# Patient Record
Sex: MALE | URBAN - METROPOLITAN AREA
[De-identification: names, ages, dates, MRNs, and addresses within clinical notes are randomized per-mention and may not be internally consistent; named-entity substitution may affect disease eponyms.]

---

## 2017-06-05 ENCOUNTER — APPOINTMENT (RX ONLY)
Dept: URBAN - METROPOLITAN AREA CLINIC 17 | Facility: CLINIC | Age: 82
Setting detail: DERMATOLOGY
End: 2017-06-05

## 2017-06-05 DIAGNOSIS — L81.4 OTHER MELANIN HYPERPIGMENTATION: ICD-10-CM

## 2017-06-05 DIAGNOSIS — L57.0 ACTINIC KERATOSIS: ICD-10-CM

## 2017-06-05 DIAGNOSIS — Z71.89 OTHER SPECIFIED COUNSELING: ICD-10-CM

## 2017-06-05 DIAGNOSIS — L82.1 OTHER SEBORRHEIC KERATOSIS: ICD-10-CM

## 2017-06-05 DIAGNOSIS — D18.0 HEMANGIOMA: ICD-10-CM

## 2017-06-05 PROBLEM — D18.01 HEMANGIOMA OF SKIN AND SUBCUTANEOUS TISSUE: Status: ACTIVE | Noted: 2017-06-05

## 2017-06-05 PROCEDURE — 99213 OFFICE O/P EST LOW 20 MIN: CPT | Mod: 25

## 2017-06-05 PROCEDURE — 17000 DESTRUCT PREMALG LESION: CPT

## 2017-06-05 PROCEDURE — ? LIQUID NITROGEN

## 2017-06-05 PROCEDURE — 17003 DESTRUCT PREMALG LES 2-14: CPT

## 2017-06-05 PROCEDURE — ? COUNSELING

## 2017-06-05 ASSESSMENT — LOCATION ZONE DERM
LOCATION ZONE: FACE
LOCATION ZONE: EAR

## 2017-06-05 ASSESSMENT — LOCATION DETAILED DESCRIPTION DERM
LOCATION DETAILED: LEFT INFERIOR TEMPLE
LOCATION DETAILED: LEFT SUPERIOR HELIX

## 2017-06-05 ASSESSMENT — LOCATION SIMPLE DESCRIPTION DERM
LOCATION SIMPLE: LEFT TEMPLE
LOCATION SIMPLE: LEFT EAR

## 2017-06-05 ASSESSMENT — TOTAL NUMBER OF LESIONS: # OF LESIONS?: 2

## 2017-06-05 NOTE — PROCEDURE: LIQUID NITROGEN
Total Number Of Aks Treated: 2
Render Post-Care Instructions In Note?: no
Duration Of Freeze Thaw-Cycle (Seconds): 5
Consent: The patient's consent was obtained including but not limited to risks of crusting, scabbing, blistering, scarring, darker or lighter pigmentary change, recurrence, incomplete removal and infection.
Detail Level: Simple
Post-Care Instructions: LIQUID NITROGEN TREATMENT\\n(Cryosurgery)\\n\\n Liquid Nitrogen (LN2) is a cold, liquefied gas with a temperature of 196 degrees below zero Celsius (minus 321 degrees Fahrenheit).  It is used to freeze and destroy superficial skin growths such as warts and keratoses.  It can also be used to treat pre-malignant skin lesions known as actinic keratoses.  LN2 causes stinging and mild pain while the growth is being frozen and then thaws.  The discomfort usually lasts less than fifteen minutes.  On the fingers a throbbing pain will occasionally last  a few hours.\\n After LN2 treatment your skin will become swollen and red and it may blister.  Then a scab will form.  It will fall off by itself in one to three weeks.  The skin growth will come off with the scab, leaving healthy, new skin.\\n Generally, no special care is needed after liquid nitrogen treatment.  Just ignore it.  You can wash your skin as usual and use make-up or other cosmetics.  If clothing irritates the area, cover it with a small bandage (Band-Aid).\\n If a very large or uncomfortable blister develops you may open it with a sterilized needle.  The needle can be sterilized by wiping with rubbing alcohol.  Gently clean the blistered area with soap and water, followed by alcohol.  Insert the needle into the edge of the blister as needed to allow the blister contents to escape.  Press the blister gently to force out the fluid or blood.  This will reduce pain caused by blister fluid pressure.  If a blister re-forms it may be opened again.  Do not remove the top of the blister since its presence helps prevent infection until the new skin beneath can replace it.  Protect open sores or punctured blisters with Polysporin or Bacitracin antibiotic ointment (available without a prescription) under a Band-Aid for 24-48 hours.\\n If you notice any signs of infection such as oozing of a discolored substance (pus), spreading redness, excessive swelling or increased pain usually appearing 2 days or more after the office treatment, please call the office.  Your usual pain reliever (such as Tylenol and aspirin) is normally sufficient to alleviate discomfort.  Intermittent use of an ice pack for 5 to 10 minutes each hour can also be used if needed.  If you have severe pain, please call the office.  Do not pick at crusted areas.  Allow them to come off on their own.\\n If a lesion was treated near your eye, you may notice swelling of one or both lids within 24 hours, so that your lids are partially closed.  This is harmless, will not affect your vision, and will resolve by itself in a day or two.\\n Sometimes LN2 fails or does not completely remove the growth.  This is especially true with larger warts, which often require more than a single treatment for cure.  If the growth is not cured with LN2 and you do not have a scheduled follow-up appointment for further treatment, please call to make a return appointment.

## 2018-06-05 ENCOUNTER — APPOINTMENT (RX ONLY)
Dept: URBAN - METROPOLITAN AREA CLINIC 17 | Facility: CLINIC | Age: 83
Setting detail: DERMATOLOGY
End: 2018-06-05

## 2018-06-05 DIAGNOSIS — Z71.89 OTHER SPECIFIED COUNSELING: ICD-10-CM

## 2018-06-05 DIAGNOSIS — L81.4 OTHER MELANIN HYPERPIGMENTATION: ICD-10-CM

## 2018-06-05 DIAGNOSIS — L85.3 XEROSIS CUTIS: ICD-10-CM

## 2018-06-05 DIAGNOSIS — D18.0 HEMANGIOMA: ICD-10-CM

## 2018-06-05 DIAGNOSIS — L57.0 ACTINIC KERATOSIS: ICD-10-CM

## 2018-06-05 DIAGNOSIS — L82.1 OTHER SEBORRHEIC KERATOSIS: ICD-10-CM

## 2018-06-05 PROBLEM — D18.01 HEMANGIOMA OF SKIN AND SUBCUTANEOUS TISSUE: Status: ACTIVE | Noted: 2018-06-05

## 2018-06-05 PROCEDURE — ? LIQUID NITROGEN

## 2018-06-05 PROCEDURE — 17003 DESTRUCT PREMALG LES 2-14: CPT

## 2018-06-05 PROCEDURE — 17000 DESTRUCT PREMALG LESION: CPT

## 2018-06-05 PROCEDURE — ? COUNSELING

## 2018-06-05 PROCEDURE — ? PRESCRIPTION

## 2018-06-05 PROCEDURE — 99213 OFFICE O/P EST LOW 20 MIN: CPT | Mod: 25

## 2018-06-05 RX ORDER — TRIAMCINOLONE ACETONIDE 1 MG/G
CREAM TOPICAL BID
Qty: 1 | Refills: 3 | Status: ERX | COMMUNITY
Start: 2018-06-05

## 2018-06-05 RX ADMIN — TRIAMCINOLONE ACETONIDE: 1 CREAM TOPICAL at 16:05

## 2018-06-05 ASSESSMENT — LOCATION ZONE DERM
LOCATION ZONE: EAR
LOCATION ZONE: HAND

## 2018-06-05 ASSESSMENT — TOTAL NUMBER OF LESIONS: # OF LESIONS?: 2

## 2018-06-05 ASSESSMENT — LOCATION DETAILED DESCRIPTION DERM
LOCATION DETAILED: LEFT ULNAR DORSAL HAND
LOCATION DETAILED: LEFT SCAPHA

## 2018-06-05 ASSESSMENT — LOCATION SIMPLE DESCRIPTION DERM
LOCATION SIMPLE: LEFT HAND
LOCATION SIMPLE: LEFT EAR

## 2018-06-05 NOTE — PROCEDURE: LIQUID NITROGEN
Render Post-Care Instructions In Note?: no
Total Number Of Aks Treated: 2
Detail Level: Simple
Post-Care Instructions: LIQUID NITROGEN TREATMENT\\n(Cryosurgery)\\n\\n Liquid Nitrogen (LN2) is a cold, liquefied gas with a temperature of 196 degrees below zero Celsius (minus 321 degrees Fahrenheit).  It is used to freeze and destroy superficial skin growths such as warts and keratoses.  It can also be used to treat pre-malignant skin lesions known as actinic keratoses.  LN2 causes stinging and mild pain while the growth is being frozen and then thaws.  The discomfort usually lasts less than fifteen minutes.  On the fingers a throbbing pain will occasionally last  a few hours.\\n After LN2 treatment your skin will become swollen and red and it may blister.  Then a scab will form.  It will fall off by itself in one to three weeks.  The skin growth will come off with the scab, leaving healthy, new skin.\\n Generally, no special care is needed after liquid nitrogen treatment.  Just ignore it.  You can wash your skin as usual and use make-up or other cosmetics.  If clothing irritates the area, cover it with a small bandage (Band-Aid).\\n If a very large or uncomfortable blister develops you may open it with a sterilized needle.  The needle can be sterilized by wiping with rubbing alcohol.  Gently clean the blistered area with soap and water, followed by alcohol.  Insert the needle into the edge of the blister as needed to allow the blister contents to escape.  Press the blister gently to force out the fluid or blood.  This will reduce pain caused by blister fluid pressure.  If a blister re-forms it may be opened again.  Do not remove the top of the blister since its presence helps prevent infection until the new skin beneath can replace it.  Protect open sores or punctured blisters with Polysporin or Bacitracin antibiotic ointment (available without a prescription) under a Band-Aid for 24-48 hours.\\n If you notice any signs of infection such as oozing of a discolored substance (pus), spreading redness, excessive swelling or increased pain usually appearing 2 days or more after the office treatment, please call the office.  Your usual pain reliever (such as Tylenol and aspirin) is normally sufficient to alleviate discomfort.  Intermittent use of an ice pack for 5 to 10 minutes each hour can also be used if needed.  If you have severe pain, please call the office.  Do not pick at crusted areas.  Allow them to come off on their own.\\n If a lesion was treated near your eye, you may notice swelling of one or both lids within 24 hours, so that your lids are partially closed.  This is harmless, will not affect your vision, and will resolve by itself in a day or two.\\n Sometimes LN2 fails or does not completely remove the growth.  This is especially true with larger warts, which often require more than a single treatment for cure.  If the growth is not cured with LN2 and you do not have a scheduled follow-up appointment for further treatment, please call to make a return appointment.
Duration Of Freeze Thaw-Cycle (Seconds): 5
Consent: The patient's consent was obtained including but not limited to risks of crusting, scabbing, blistering, scarring, darker or lighter pigmentary change, recurrence, incomplete removal and infection.

## 2019-07-22 ENCOUNTER — APPOINTMENT (RX ONLY)
Dept: URBAN - METROPOLITAN AREA CLINIC 17 | Facility: CLINIC | Age: 84
Setting detail: DERMATOLOGY
End: 2019-07-22

## 2019-07-22 DIAGNOSIS — Z71.89 OTHER SPECIFIED COUNSELING: ICD-10-CM

## 2019-07-22 DIAGNOSIS — D18.0 HEMANGIOMA: ICD-10-CM

## 2019-07-22 DIAGNOSIS — L82.1 OTHER SEBORRHEIC KERATOSIS: ICD-10-CM

## 2019-07-22 DIAGNOSIS — L81.4 OTHER MELANIN HYPERPIGMENTATION: ICD-10-CM

## 2019-07-22 DIAGNOSIS — L57.0 ACTINIC KERATOSIS: ICD-10-CM

## 2019-07-22 DIAGNOSIS — L85.3 XEROSIS CUTIS: ICD-10-CM

## 2019-07-22 PROBLEM — D18.01 HEMANGIOMA OF SKIN AND SUBCUTANEOUS TISSUE: Status: ACTIVE | Noted: 2019-07-22

## 2019-07-22 PROCEDURE — ? PRESCRIPTION

## 2019-07-22 PROCEDURE — ? COUNSELING

## 2019-07-22 PROCEDURE — 17000 DESTRUCT PREMALG LESION: CPT

## 2019-07-22 PROCEDURE — ? LIQUID NITROGEN

## 2019-07-22 PROCEDURE — 99213 OFFICE O/P EST LOW 20 MIN: CPT | Mod: 25

## 2019-07-22 PROCEDURE — 17003 DESTRUCT PREMALG LES 2-14: CPT

## 2019-07-22 RX ORDER — TRIAMCINOLONE ACETONIDE 1 MG/G
CREAM TOPICAL BID
Qty: 1 | Refills: 3 | Status: ERX

## 2019-07-22 ASSESSMENT — TOTAL NUMBER OF LESIONS: # OF LESIONS?: 8

## 2019-07-22 ASSESSMENT — LOCATION DETAILED DESCRIPTION DERM
LOCATION DETAILED: RIGHT RADIAL DORSAL HAND
LOCATION DETAILED: LEFT ULNAR DORSAL HAND
LOCATION DETAILED: LEFT INFERIOR LATERAL FOREHEAD

## 2019-07-22 ASSESSMENT — LOCATION ZONE DERM
LOCATION ZONE: HAND
LOCATION ZONE: FACE

## 2019-07-22 ASSESSMENT — LOCATION SIMPLE DESCRIPTION DERM
LOCATION SIMPLE: RIGHT HAND
LOCATION SIMPLE: LEFT FOREHEAD
LOCATION SIMPLE: LEFT HAND

## 2019-07-22 NOTE — PROCEDURE: LIQUID NITROGEN
Post-Care Instructions: LIQUID NITROGEN TREATMENT\\n(Cryosurgery)\\n\\n Liquid Nitrogen (LN2) is a cold, liquefied gas with a temperature of 196 degrees below zero Celsius (minus 321 degrees Fahrenheit).  It is used to freeze and destroy superficial skin growths such as warts and keratoses.  It can also be used to treat pre-malignant skin lesions known as actinic keratoses.  LN2 causes stinging and mild pain while the growth is being frozen and then thaws.  The discomfort usually lasts less than fifteen minutes.  On the fingers a throbbing pain will occasionally last  a few hours.\\n After LN2 treatment your skin will become swollen and red and it may blister.  Then a scab will form.  It will fall off by itself in one to three weeks.  The skin growth will come off with the scab, leaving healthy, new skin.\\n Generally, no special care is needed after liquid nitrogen treatment.  Just ignore it.  You can wash your skin as usual and use make-up or other cosmetics.  If clothing irritates the area, cover it with a small bandage (Band-Aid).\\n If a very large or uncomfortable blister develops you may open it with a sterilized needle.  The needle can be sterilized by wiping with rubbing alcohol.  Gently clean the blistered area with soap and water, followed by alcohol.  Insert the needle into the edge of the blister as needed to allow the blister contents to escape.  Press the blister gently to force out the fluid or blood.  This will reduce pain caused by blister fluid pressure.  If a blister re-forms it may be opened again.  Do not remove the top of the blister since its presence helps prevent infection until the new skin beneath can replace it.  Protect open sores or punctured blisters with Polysporin or Bacitracin antibiotic ointment (available without a prescription) under a Band-Aid for 24-48 hours.\\n If you notice any signs of infection such as oozing of a discolored substance (pus), spreading redness, excessive swelling or increased pain usually appearing 2 days or more after the office treatment, please call the office.  Your usual pain reliever (such as Tylenol and aspirin) is normally sufficient to alleviate discomfort.  Intermittent use of an ice pack for 5 to 10 minutes each hour can also be used if needed.  If you have severe pain, please call the office.  Do not pick at crusted areas.  Allow them to come off on their own.\\n If a lesion was treated near your eye, you may notice swelling of one or both lids within 24 hours, so that your lids are partially closed.  This is harmless, will not affect your vision, and will resolve by itself in a day or two.\\n Sometimes LN2 fails or does not completely remove the growth.  This is especially true with larger warts, which often require more than a single treatment for cure.  If the growth is not cured with LN2 and you do not have a scheduled follow-up appointment for further treatment, please call to make a return appointment.
Consent: The patient's consent was obtained including but not limited to risks of crusting, scabbing, blistering, scarring, darker or lighter pigmentary change, recurrence, incomplete removal and infection.
Detail Level: Simple
Duration Of Freeze Thaw-Cycle (Seconds): 5
Render Post-Care Instructions In Note?: no
Total Number Of Aks Treated: 8

## 2020-08-06 ENCOUNTER — APPOINTMENT (RX ONLY)
Dept: URBAN - METROPOLITAN AREA CLINIC 151 | Facility: CLINIC | Age: 85
Setting detail: DERMATOLOGY
End: 2020-08-06

## 2020-08-06 DIAGNOSIS — L57.0 ACTINIC KERATOSIS: ICD-10-CM

## 2020-08-06 DIAGNOSIS — L91.8 OTHER HYPERTROPHIC DISORDERS OF THE SKIN: ICD-10-CM

## 2020-08-06 DIAGNOSIS — D18.0 HEMANGIOMA: ICD-10-CM

## 2020-08-06 DIAGNOSIS — L82.0 INFLAMED SEBORRHEIC KERATOSIS: ICD-10-CM

## 2020-08-06 DIAGNOSIS — D22 MELANOCYTIC NEVI: ICD-10-CM

## 2020-08-06 PROBLEM — D18.01 HEMANGIOMA OF SKIN AND SUBCUTANEOUS TISSUE: Status: ACTIVE | Noted: 2020-08-06

## 2020-08-06 PROBLEM — D22.61 MELANOCYTIC NEVI OF RIGHT UPPER LIMB, INCLUDING SHOULDER: Status: ACTIVE | Noted: 2020-08-06

## 2020-08-06 PROBLEM — D22.5 MELANOCYTIC NEVI OF TRUNK: Status: ACTIVE | Noted: 2020-08-06

## 2020-08-06 PROCEDURE — 99203 OFFICE O/P NEW LOW 30 MIN: CPT | Mod: 25

## 2020-08-06 PROCEDURE — 17000 DESTRUCT PREMALG LESION: CPT | Mod: 59

## 2020-08-06 PROCEDURE — 17110 DESTRUCTION B9 LES UP TO 14: CPT

## 2020-08-06 PROCEDURE — ? COUNSELING

## 2020-08-06 PROCEDURE — ? LIQUID NITROGEN

## 2020-08-06 ASSESSMENT — LOCATION SIMPLE DESCRIPTION DERM
LOCATION SIMPLE: RIGHT SHOULDER
LOCATION SIMPLE: RIGHT ZYGOMA
LOCATION SIMPLE: ABDOMEN
LOCATION SIMPLE: CHEST
LOCATION SIMPLE: GROIN

## 2020-08-06 ASSESSMENT — LOCATION DETAILED DESCRIPTION DERM
LOCATION DETAILED: RIGHT ANTERIOR SHOULDER
LOCATION DETAILED: RIGHT MEDIAL ZYGOMA
LOCATION DETAILED: RIGHT RIB CAGE
LOCATION DETAILED: LEFT RIB CAGE
LOCATION DETAILED: EPIGASTRIC SKIN
LOCATION DETAILED: LEFT LATERAL INFERIOR CHEST
LOCATION DETAILED: SUPRAPUBIC SKIN
LOCATION DETAILED: STERNUM

## 2020-08-06 ASSESSMENT — LOCATION ZONE DERM
LOCATION ZONE: FACE
LOCATION ZONE: ARM
LOCATION ZONE: TRUNK

## 2020-08-06 NOTE — PROCEDURE: LIQUID NITROGEN
Detail Level: Detailed
Post-Care Instructions: I reviewed with the patient in detail post-care instructions. Patient is to wear sunprotection, and avoid picking at any of the treated lesions. Pt may apply Vaseline to crusted or scabbing areas.
Add 52 Modifier (Optional): no
Number Of Freeze-Thaw Cycles: 2 freeze-thaw cycles
Consent: The patient's consent was obtained including but not limited to risks of crusting, scabbing, blistering, scarring, darker or lighter pigmentary change, recurrence, incomplete removal and infection.
Medical Necessity Clause: This procedure was medically necessary because the lesions that were treated were:
Medical Necessity Information: It is in your best interest to select a reason for this procedure from the list below. All of these items fulfill various CMS LCD requirements except the new and changing color options.
Duration Of Freeze Thaw-Cycle (Seconds): 5

## 2021-02-15 ENCOUNTER — APPOINTMENT (RX ONLY)
Dept: URBAN - METROPOLITAN AREA CLINIC 151 | Facility: CLINIC | Age: 86
Setting detail: DERMATOLOGY
End: 2021-02-15

## 2021-02-15 DIAGNOSIS — L30.8 OTHER SPECIFIED DERMATITIS: ICD-10-CM

## 2021-02-15 DIAGNOSIS — L57.0 ACTINIC KERATOSIS: ICD-10-CM

## 2021-02-15 DIAGNOSIS — D22 MELANOCYTIC NEVI: ICD-10-CM

## 2021-02-15 DIAGNOSIS — L85.3 XEROSIS CUTIS: ICD-10-CM

## 2021-02-15 DIAGNOSIS — D18.0 HEMANGIOMA: ICD-10-CM

## 2021-02-15 PROBLEM — D22.5 MELANOCYTIC NEVI OF TRUNK: Status: ACTIVE | Noted: 2021-02-15

## 2021-02-15 PROBLEM — J30.1 ALLERGIC RHINITIS DUE TO POLLEN: Status: ACTIVE | Noted: 2021-02-15

## 2021-02-15 PROBLEM — D22.61 MELANOCYTIC NEVI OF RIGHT UPPER LIMB, INCLUDING SHOULDER: Status: ACTIVE | Noted: 2021-02-15

## 2021-02-15 PROBLEM — D18.01 HEMANGIOMA OF SKIN AND SUBCUTANEOUS TISSUE: Status: ACTIVE | Noted: 2021-02-15

## 2021-02-15 PROCEDURE — 17003 DESTRUCT PREMALG LES 2-14: CPT

## 2021-02-15 PROCEDURE — ? COUNSELING

## 2021-02-15 PROCEDURE — ? LIQUID NITROGEN

## 2021-02-15 PROCEDURE — 17000 DESTRUCT PREMALG LESION: CPT

## 2021-02-15 PROCEDURE — 99213 OFFICE O/P EST LOW 20 MIN: CPT | Mod: 25

## 2021-02-15 ASSESSMENT — LOCATION SIMPLE DESCRIPTION DERM
LOCATION SIMPLE: LEFT FOREARM
LOCATION SIMPLE: ABDOMEN
LOCATION SIMPLE: RIGHT SHOULDER
LOCATION SIMPLE: LEFT CHEEK
LOCATION SIMPLE: RIGHT LOWER BACK
LOCATION SIMPLE: RIGHT FOREARM
LOCATION SIMPLE: RIGHT CHEEK
LOCATION SIMPLE: CHEST

## 2021-02-15 ASSESSMENT — LOCATION DETAILED DESCRIPTION DERM
LOCATION DETAILED: RIGHT SUPERIOR LATERAL LOWER BACK
LOCATION DETAILED: RIGHT SUPERIOR LATERAL BUCCAL CHEEK
LOCATION DETAILED: LEFT CENTRAL MALAR CHEEK
LOCATION DETAILED: LEFT PROXIMAL DORSAL FOREARM
LOCATION DETAILED: LEFT RIB CAGE
LOCATION DETAILED: RIGHT PROXIMAL DORSAL FOREARM
LOCATION DETAILED: RIGHT ANTERIOR SHOULDER
LOCATION DETAILED: EPIGASTRIC SKIN
LOCATION DETAILED: LEFT LATERAL INFERIOR CHEST

## 2021-02-15 ASSESSMENT — LOCATION ZONE DERM
LOCATION ZONE: TRUNK
LOCATION ZONE: FACE
LOCATION ZONE: ARM

## 2021-02-15 NOTE — PROCEDURE: LIQUID NITROGEN
Number Of Freeze-Thaw Cycles: 3 freeze-thaw cycles
Consent: The patient's consent was obtained including but not limited to risks of crusting, scabbing, blistering, scarring, darker or lighter pigmentary change, recurrence, incomplete removal and infection.
Render Note In Bullet Format When Appropriate: No
Duration Of Freeze Thaw-Cycle (Seconds): 1
Post-Care Instructions: I reviewed with the patient in detail post-care instructions. Patient is to wear sunprotection, and avoid picking at any of the treated lesions. Pt may apply Vaseline to crusted or scabbing areas.
Detail Level: Detailed

## 2022-06-08 ENCOUNTER — APPOINTMENT (RX ONLY)
Dept: URBAN - METROPOLITAN AREA CLINIC 151 | Facility: CLINIC | Age: 87
Setting detail: DERMATOLOGY
End: 2022-06-08

## 2022-06-08 DIAGNOSIS — L81.4 OTHER MELANIN HYPERPIGMENTATION: ICD-10-CM

## 2022-06-08 DIAGNOSIS — D18.0 HEMANGIOMA: ICD-10-CM

## 2022-06-08 DIAGNOSIS — L57.8 OTHER SKIN CHANGES DUE TO CHRONIC EXPOSURE TO NONIONIZING RADIATION: ICD-10-CM

## 2022-06-08 DIAGNOSIS — D22 MELANOCYTIC NEVI: ICD-10-CM

## 2022-06-08 DIAGNOSIS — L82.1 OTHER SEBORRHEIC KERATOSIS: ICD-10-CM

## 2022-06-08 DIAGNOSIS — Z71.89 OTHER SPECIFIED COUNSELING: ICD-10-CM

## 2022-06-08 PROBLEM — D22.5 MELANOCYTIC NEVI OF TRUNK: Status: ACTIVE | Noted: 2022-06-08

## 2022-06-08 PROBLEM — D18.01 HEMANGIOMA OF SKIN AND SUBCUTANEOUS TISSUE: Status: ACTIVE | Noted: 2022-06-08

## 2022-06-08 PROBLEM — D48.5 NEOPLASM OF UNCERTAIN BEHAVIOR OF SKIN: Status: ACTIVE | Noted: 2022-06-08

## 2022-06-08 PROCEDURE — 99213 OFFICE O/P EST LOW 20 MIN: CPT | Mod: 25

## 2022-06-08 PROCEDURE — ? BIOPSY BY SHAVE METHOD

## 2022-06-08 PROCEDURE — ? COUNSELING

## 2022-06-08 PROCEDURE — 11102 TANGNTL BX SKIN SINGLE LES: CPT

## 2022-06-08 ASSESSMENT — LOCATION ZONE DERM
LOCATION ZONE: ARM
LOCATION ZONE: TRUNK

## 2022-06-08 ASSESSMENT — LOCATION DETAILED DESCRIPTION DERM
LOCATION DETAILED: RIGHT MID-UPPER BACK
LOCATION DETAILED: LEFT MEDIAL INFERIOR CHEST
LOCATION DETAILED: RIGHT DISTAL DORSAL FOREARM
LOCATION DETAILED: SUPERIOR THORACIC SPINE
LOCATION DETAILED: LEFT PROXIMAL DORSAL FOREARM
LOCATION DETAILED: RIGHT SUPERIOR MEDIAL UPPER BACK
LOCATION DETAILED: LEFT DISTAL DORSAL FOREARM
LOCATION DETAILED: LEFT CLAVICULAR SKIN
LOCATION DETAILED: LEFT MID-UPPER BACK
LOCATION DETAILED: RIGHT PROXIMAL DORSAL FOREARM

## 2022-06-08 ASSESSMENT — LOCATION SIMPLE DESCRIPTION DERM
LOCATION SIMPLE: LEFT CLAVICULAR SKIN
LOCATION SIMPLE: CHEST
LOCATION SIMPLE: RIGHT FOREARM
LOCATION SIMPLE: RIGHT UPPER BACK
LOCATION SIMPLE: UPPER BACK
LOCATION SIMPLE: LEFT FOREARM
LOCATION SIMPLE: LEFT UPPER BACK

## 2022-06-08 NOTE — PROCEDURE: BIOPSY BY SHAVE METHOD
Curettage Text: The wound bed was treated with curettage after the biopsy was performed.
Dressing: bandage
Render Path Notes In Note?: No
Information: Selecting Yes will display possible errors in your note based on the variables you have selected. This validation is only offered as a suggestion for you. PLEASE NOTE THAT THE VALIDATION TEXT WILL BE REMOVED WHEN YOU FINALIZE YOUR NOTE. IF YOU WANT TO FAX A PRELIMINARY NOTE YOU WILL NEED TO TOGGLE THIS TO 'NO' IF YOU DO NOT WANT IT IN YOUR FAXED NOTE.
Billing Type: Third-Party Bill
Type Of Destruction Used: Curettage
Additional Anesthesia Volume In Cc (Will Not Render If 0): 0
Post-Care Instructions: I reviewed with the patient in detail post-care instructions. Patient is to keep the biopsy site dry overnight, and then apply bacitracin twice daily until healed. Patient may apply hydrogen peroxide soaks to remove any crusting.
Anesthesia Type: 1% lidocaine with epinephrine
Electrodesiccation And Curettage Text: The wound bed was treated with electrodesiccation and curettage after the biopsy was performed.
Was A Bandage Applied: Yes
Biopsy Method: Dermablade
Wound Care: Bacitracin
Depth Of Biopsy: dermis
Hemostasis: Drysol
Biopsy Type: H and E
Electrodesiccation Text: The wound bed was treated with electrodesiccation after the biopsy was performed.
Anesthesia Volume In Cc: 0.5
Consent: Written consent was obtained and risks were reviewed including but not limited to scarring, infection, bleeding, scabbing, incomplete removal, nerve damage and allergy to anesthesia.
Silver Nitrate Text: The wound bed was treated with silver nitrate after the biopsy was performed.
Size Of Lesion In Cm: 0.6
Detail Level: Detailed
Notification Instructions: Patient will be notified of biopsy results. However, patient instructed to call the office if not contacted within 2 weeks.
Cryotherapy Text: The wound bed was treated with cryotherapy after the biopsy was performed.

## 2022-06-28 ENCOUNTER — APPOINTMENT (RX ONLY)
Dept: URBAN - METROPOLITAN AREA CLINIC 151 | Facility: CLINIC | Age: 87
Setting detail: DERMATOLOGY
End: 2022-06-28

## 2022-06-28 PROBLEM — C44.329 SQUAMOUS CELL CARCINOMA OF SKIN OF OTHER PARTS OF FACE: Status: ACTIVE | Noted: 2022-06-28

## 2022-06-28 PROCEDURE — 17280 DSTR MAL LS F/E/E/N/L/M .5/<: CPT

## 2022-06-28 PROCEDURE — ? DIAGNOSIS COMMENT

## 2022-06-28 PROCEDURE — ? CURETTAGE AND DESTRUCTION

## 2022-06-28 NOTE — PROCEDURE: DIAGNOSIS COMMENT
Comment: Risks benefits and alternatives discussed with patient ED&C vs Mohs discussed.
Detail Level: Simple
Render Risk Assessment In Note?: no

## 2022-10-31 ENCOUNTER — APPOINTMENT (RX ONLY)
Dept: URBAN - METROPOLITAN AREA CLINIC 151 | Facility: CLINIC | Age: 87
Setting detail: DERMATOLOGY
End: 2022-10-31

## 2022-10-31 DIAGNOSIS — D18.0 HEMANGIOMA: ICD-10-CM

## 2022-10-31 DIAGNOSIS — L82.1 OTHER SEBORRHEIC KERATOSIS: ICD-10-CM

## 2022-10-31 PROBLEM — C44.329 SQUAMOUS CELL CARCINOMA OF SKIN OF OTHER PARTS OF FACE: Status: ACTIVE | Noted: 2022-10-31

## 2022-10-31 PROBLEM — D18.01 HEMANGIOMA OF SKIN AND SUBCUTANEOUS TISSUE: Status: ACTIVE | Noted: 2022-10-31

## 2022-10-31 PROCEDURE — 99213 OFFICE O/P EST LOW 20 MIN: CPT

## 2022-10-31 PROCEDURE — ? COUNSELING

## 2022-10-31 ASSESSMENT — LOCATION DETAILED DESCRIPTION DERM
LOCATION DETAILED: LEFT MID TEMPLE
LOCATION DETAILED: RIGHT SUPERIOR PARIETAL SCALP
LOCATION DETAILED: RIGHT MEDIAL FRONTAL SCALP

## 2022-10-31 ASSESSMENT — LOCATION ZONE DERM
LOCATION ZONE: SCALP
LOCATION ZONE: FACE

## 2022-10-31 ASSESSMENT — LOCATION SIMPLE DESCRIPTION DERM
LOCATION SIMPLE: RIGHT SCALP
LOCATION SIMPLE: SCALP
LOCATION SIMPLE: LEFT TEMPLE

## 2023-05-01 ENCOUNTER — APPOINTMENT (RX ONLY)
Dept: URBAN - METROPOLITAN AREA CLINIC 151 | Facility: CLINIC | Age: 88
Setting detail: DERMATOLOGY
End: 2023-05-01

## 2023-05-01 DIAGNOSIS — L57.0 ACTINIC KERATOSIS: ICD-10-CM

## 2023-05-01 DIAGNOSIS — L81.4 OTHER MELANIN HYPERPIGMENTATION: ICD-10-CM

## 2023-05-01 DIAGNOSIS — Z71.89 OTHER SPECIFIED COUNSELING: ICD-10-CM

## 2023-05-01 DIAGNOSIS — D22 MELANOCYTIC NEVI: ICD-10-CM

## 2023-05-01 DIAGNOSIS — L57.8 OTHER SKIN CHANGES DUE TO CHRONIC EXPOSURE TO NONIONIZING RADIATION: ICD-10-CM

## 2023-05-01 DIAGNOSIS — L82.1 OTHER SEBORRHEIC KERATOSIS: ICD-10-CM

## 2023-05-01 DIAGNOSIS — D18.0 HEMANGIOMA: ICD-10-CM

## 2023-05-01 PROBLEM — D18.01 HEMANGIOMA OF SKIN AND SUBCUTANEOUS TISSUE: Status: ACTIVE | Noted: 2023-05-01

## 2023-05-01 PROBLEM — D22.5 MELANOCYTIC NEVI OF TRUNK: Status: ACTIVE | Noted: 2023-05-01

## 2023-05-01 PROCEDURE — 17000 DESTRUCT PREMALG LESION: CPT

## 2023-05-01 PROCEDURE — ? COUNSELING

## 2023-05-01 PROCEDURE — 17003 DESTRUCT PREMALG LES 2-14: CPT

## 2023-05-01 PROCEDURE — ? LIQUID NITROGEN

## 2023-05-01 PROCEDURE — 99213 OFFICE O/P EST LOW 20 MIN: CPT | Mod: 25

## 2023-05-01 ASSESSMENT — LOCATION SIMPLE DESCRIPTION DERM
LOCATION SIMPLE: LEFT FOREARM
LOCATION SIMPLE: RIGHT UPPER BACK
LOCATION SIMPLE: LEFT CLAVICULAR SKIN
LOCATION SIMPLE: UPPER BACK
LOCATION SIMPLE: RIGHT FOREARM
LOCATION SIMPLE: LEFT UPPER BACK
LOCATION SIMPLE: LEFT HAND
LOCATION SIMPLE: CHEST

## 2023-05-01 ASSESSMENT — LOCATION ZONE DERM
LOCATION ZONE: ARM
LOCATION ZONE: HAND
LOCATION ZONE: TRUNK

## 2023-05-01 ASSESSMENT — LOCATION DETAILED DESCRIPTION DERM
LOCATION DETAILED: LEFT PROXIMAL DORSAL FOREARM
LOCATION DETAILED: LEFT RADIAL DORSAL HAND
LOCATION DETAILED: RIGHT PROXIMAL DORSAL FOREARM
LOCATION DETAILED: RIGHT MID-UPPER BACK
LOCATION DETAILED: RIGHT SUPERIOR MEDIAL UPPER BACK
LOCATION DETAILED: LEFT CLAVICULAR SKIN
LOCATION DETAILED: SUPERIOR THORACIC SPINE
LOCATION DETAILED: LEFT ULNAR DORSAL HAND
LOCATION DETAILED: LEFT DISTAL DORSAL FOREARM
LOCATION DETAILED: LEFT MID-UPPER BACK
LOCATION DETAILED: RIGHT DISTAL DORSAL FOREARM
LOCATION DETAILED: LEFT MEDIAL INFERIOR CHEST

## 2023-11-01 ENCOUNTER — APPOINTMENT (RX ONLY)
Dept: URBAN - METROPOLITAN AREA CLINIC 151 | Facility: CLINIC | Age: 88
Setting detail: DERMATOLOGY
End: 2023-11-01

## 2023-11-01 DIAGNOSIS — D18.0 HEMANGIOMA: ICD-10-CM

## 2023-11-01 DIAGNOSIS — L57.0 ACTINIC KERATOSIS: ICD-10-CM

## 2023-11-01 DIAGNOSIS — L57.8 OTHER SKIN CHANGES DUE TO CHRONIC EXPOSURE TO NONIONIZING RADIATION: ICD-10-CM

## 2023-11-01 DIAGNOSIS — Z71.89 OTHER SPECIFIED COUNSELING: ICD-10-CM

## 2023-11-01 DIAGNOSIS — L82.1 OTHER SEBORRHEIC KERATOSIS: ICD-10-CM

## 2023-11-01 DIAGNOSIS — D69.2 OTHER NONTHROMBOCYTOPENIC PURPURA: ICD-10-CM

## 2023-11-01 DIAGNOSIS — L81.4 OTHER MELANIN HYPERPIGMENTATION: ICD-10-CM

## 2023-11-01 DIAGNOSIS — Z85.828 PERSONAL HISTORY OF OTHER MALIGNANT NEOPLASM OF SKIN: ICD-10-CM

## 2023-11-01 PROBLEM — D18.01 HEMANGIOMA OF SKIN AND SUBCUTANEOUS TISSUE: Status: ACTIVE | Noted: 2023-11-01

## 2023-11-01 PROCEDURE — 17000 DESTRUCT PREMALG LESION: CPT

## 2023-11-01 PROCEDURE — 17003 DESTRUCT PREMALG LES 2-14: CPT

## 2023-11-01 PROCEDURE — ? LIQUID NITROGEN

## 2023-11-01 PROCEDURE — 99213 OFFICE O/P EST LOW 20 MIN: CPT | Mod: 25

## 2023-11-01 PROCEDURE — ? SUNSCREEN RECOMMENDATIONS

## 2023-11-01 PROCEDURE — ? COUNSELING

## 2023-11-01 ASSESSMENT — LOCATION DETAILED DESCRIPTION DERM
LOCATION DETAILED: RIGHT SUPERIOR CRUS OF ANTIHELIX
LOCATION DETAILED: LEFT MEDIAL TRAPEZIAL NECK
LOCATION DETAILED: RIGHT RADIAL DORSAL HAND
LOCATION DETAILED: LEFT INFERIOR POSTERIOR NECK
LOCATION DETAILED: LEFT LATERAL ABDOMEN
LOCATION DETAILED: RIGHT MID-UPPER BACK
LOCATION DETAILED: LEFT DISTAL DORSAL FOREARM
LOCATION DETAILED: LEFT SUPERIOR ANTERIOR NECK
LOCATION DETAILED: LEFT PROXIMAL RADIAL DORSAL FOREARM
LOCATION DETAILED: RIGHT SUPERIOR HELIX

## 2023-11-01 ASSESSMENT — LOCATION ZONE DERM
LOCATION ZONE: ARM
LOCATION ZONE: HAND
LOCATION ZONE: EAR
LOCATION ZONE: NECK
LOCATION ZONE: TRUNK

## 2023-11-01 ASSESSMENT — LOCATION SIMPLE DESCRIPTION DERM
LOCATION SIMPLE: LEFT FOREARM
LOCATION SIMPLE: LEFT ANTERIOR NECK
LOCATION SIMPLE: POSTERIOR NECK
LOCATION SIMPLE: ABDOMEN
LOCATION SIMPLE: RIGHT EAR
LOCATION SIMPLE: RIGHT HAND
LOCATION SIMPLE: RIGHT UPPER BACK

## 2024-10-16 ENCOUNTER — APPOINTMENT (RX ONLY)
Dept: URBAN - METROPOLITAN AREA CLINIC 151 | Facility: CLINIC | Age: 89
Setting detail: DERMATOLOGY
End: 2024-10-16

## 2024-10-16 DIAGNOSIS — L57.0 ACTINIC KERATOSIS: ICD-10-CM

## 2024-10-16 DIAGNOSIS — Z71.89 OTHER SPECIFIED COUNSELING: ICD-10-CM

## 2024-10-16 DIAGNOSIS — L82.1 OTHER SEBORRHEIC KERATOSIS: ICD-10-CM

## 2024-10-16 DIAGNOSIS — L57.8 OTHER SKIN CHANGES DUE TO CHRONIC EXPOSURE TO NONIONIZING RADIATION: ICD-10-CM

## 2024-10-16 DIAGNOSIS — L81.4 OTHER MELANIN HYPERPIGMENTATION: ICD-10-CM

## 2024-10-16 DIAGNOSIS — D18.0 HEMANGIOMA: ICD-10-CM

## 2024-10-16 PROBLEM — D18.01 HEMANGIOMA OF SKIN AND SUBCUTANEOUS TISSUE: Status: ACTIVE | Noted: 2024-10-16

## 2024-10-16 PROCEDURE — ? SUNSCREEN RECOMMENDATIONS

## 2024-10-16 PROCEDURE — 99213 OFFICE O/P EST LOW 20 MIN: CPT | Mod: 25

## 2024-10-16 PROCEDURE — ? LIQUID NITROGEN

## 2024-10-16 PROCEDURE — 17003 DESTRUCT PREMALG LES 2-14: CPT

## 2024-10-16 PROCEDURE — 17000 DESTRUCT PREMALG LESION: CPT

## 2024-10-16 PROCEDURE — ? COUNSELING

## 2024-10-16 ASSESSMENT — LOCATION DETAILED DESCRIPTION DERM
LOCATION DETAILED: RIGHT RADIAL DORSAL HAND
LOCATION DETAILED: LEFT MEDIAL TRAPEZIAL NECK
LOCATION DETAILED: LEFT INFERIOR POSTERIOR NECK
LOCATION DETAILED: RIGHT MID-UPPER BACK
LOCATION DETAILED: LEFT LATERAL ABDOMEN
LOCATION DETAILED: RIGHT ULNAR DORSAL HAND
LOCATION DETAILED: LEFT PROXIMAL RADIAL DORSAL FOREARM
LOCATION DETAILED: LEFT SUPERIOR ANTERIOR NECK

## 2024-10-16 ASSESSMENT — LOCATION SIMPLE DESCRIPTION DERM
LOCATION SIMPLE: RIGHT HAND
LOCATION SIMPLE: LEFT ANTERIOR NECK
LOCATION SIMPLE: ABDOMEN
LOCATION SIMPLE: POSTERIOR NECK
LOCATION SIMPLE: LEFT FOREARM
LOCATION SIMPLE: RIGHT UPPER BACK

## 2024-10-16 ASSESSMENT — LOCATION ZONE DERM
LOCATION ZONE: NECK
LOCATION ZONE: TRUNK
LOCATION ZONE: ARM
LOCATION ZONE: HAND

## 2025-04-16 ENCOUNTER — APPOINTMENT (OUTPATIENT)
Dept: URBAN - METROPOLITAN AREA CLINIC 151 | Facility: CLINIC | Age: OVER 89
Setting detail: DERMATOLOGY
End: 2025-04-16

## 2025-04-16 DIAGNOSIS — L57.0 ACTINIC KERATOSIS: ICD-10-CM

## 2025-04-16 DIAGNOSIS — L57.8 OTHER SKIN CHANGES DUE TO CHRONIC EXPOSURE TO NONIONIZING RADIATION: ICD-10-CM

## 2025-04-16 DIAGNOSIS — L81.4 OTHER MELANIN HYPERPIGMENTATION: ICD-10-CM

## 2025-04-16 DIAGNOSIS — L82.1 OTHER SEBORRHEIC KERATOSIS: ICD-10-CM

## 2025-04-16 DIAGNOSIS — Z71.89 OTHER SPECIFIED COUNSELING: ICD-10-CM

## 2025-04-16 DIAGNOSIS — D22 MELANOCYTIC NEVI: ICD-10-CM

## 2025-04-16 DIAGNOSIS — D18.0 HEMANGIOMA: ICD-10-CM

## 2025-04-16 PROBLEM — D18.01 HEMANGIOMA OF SKIN AND SUBCUTANEOUS TISSUE: Status: ACTIVE | Noted: 2025-04-16

## 2025-04-16 PROBLEM — D22.5 MELANOCYTIC NEVI OF TRUNK: Status: ACTIVE | Noted: 2025-04-16

## 2025-04-16 PROCEDURE — 99213 OFFICE O/P EST LOW 20 MIN: CPT | Mod: 25

## 2025-04-16 PROCEDURE — 17000 DESTRUCT PREMALG LESION: CPT

## 2025-04-16 PROCEDURE — ? LIQUID NITROGEN

## 2025-04-16 PROCEDURE — ? COUNSELING

## 2025-04-16 ASSESSMENT — LOCATION SIMPLE DESCRIPTION DERM
LOCATION SIMPLE: LEFT CLAVICULAR SKIN
LOCATION SIMPLE: LEFT UPPER BACK
LOCATION SIMPLE: LEFT LOWER BACK
LOCATION SIMPLE: ABDOMEN
LOCATION SIMPLE: RIGHT CHEEK
LOCATION SIMPLE: RIGHT UPPER BACK
LOCATION SIMPLE: LEFT FOREARM
LOCATION SIMPLE: CHEST
LOCATION SIMPLE: RIGHT FOREARM

## 2025-04-16 ASSESSMENT — LOCATION DETAILED DESCRIPTION DERM
LOCATION DETAILED: RIGHT CENTRAL MALAR CHEEK
LOCATION DETAILED: LEFT MID-UPPER BACK
LOCATION DETAILED: LEFT MEDIAL INFERIOR CHEST
LOCATION DETAILED: RIGHT SUPERIOR MEDIAL UPPER BACK
LOCATION DETAILED: RIGHT DISTAL DORSAL FOREARM
LOCATION DETAILED: LEFT INFERIOR LATERAL MIDBACK
LOCATION DETAILED: RIGHT PROXIMAL DORSAL FOREARM
LOCATION DETAILED: RIGHT MID-UPPER BACK
LOCATION DETAILED: LEFT PROXIMAL DORSAL FOREARM
LOCATION DETAILED: LEFT DISTAL DORSAL FOREARM
LOCATION DETAILED: LEFT RIB CAGE
LOCATION DETAILED: PERIUMBILICAL SKIN
LOCATION DETAILED: LEFT CLAVICULAR SKIN

## 2025-04-16 ASSESSMENT — LOCATION ZONE DERM
LOCATION ZONE: ARM
LOCATION ZONE: FACE
LOCATION ZONE: TRUNK